# Patient Record
Sex: FEMALE | URBAN - METROPOLITAN AREA
[De-identification: names, ages, dates, MRNs, and addresses within clinical notes are randomized per-mention and may not be internally consistent; named-entity substitution may affect disease eponyms.]

---

## 2022-08-16 ENCOUNTER — OFFICE VISIT (OUTPATIENT)
Dept: SPORTS MEDICINE | Age: 77
End: 2022-08-16
Payer: MEDICARE

## 2022-08-16 VITALS
HEART RATE: 60 BPM | HEIGHT: 62 IN | SYSTOLIC BLOOD PRESSURE: 128 MMHG | BODY MASS INDEX: 32.42 KG/M2 | TEMPERATURE: 97.2 F | WEIGHT: 176.2 LBS | DIASTOLIC BLOOD PRESSURE: 74 MMHG | OXYGEN SATURATION: 97 %

## 2022-08-16 DIAGNOSIS — M46.1 SACROILIITIS (HCC): ICD-10-CM

## 2022-08-16 DIAGNOSIS — M21.70 LEG LENGTH DISCREPANCY: ICD-10-CM

## 2022-08-16 DIAGNOSIS — M47.816 SPONDYLOSIS OF LUMBAR REGION WITHOUT MYELOPATHY OR RADICULOPATHY: Primary | ICD-10-CM

## 2022-08-16 PROCEDURE — 1123F ACP DISCUSS/DSCN MKR DOCD: CPT | Performed by: FAMILY MEDICINE

## 2022-08-16 PROCEDURE — 99213 OFFICE O/P EST LOW 20 MIN: CPT | Performed by: FAMILY MEDICINE

## 2022-08-16 ASSESSMENT — ENCOUNTER SYMPTOMS
CONSTIPATION: 0
DIARRHEA: 0
SHORTNESS OF BREATH: 0
BACK PAIN: 0
NAUSEA: 0

## 2022-08-16 NOTE — PROGRESS NOTES
[facility]) Physicians  Neurosurgery and Pain [facility]  [address] [facility] Cathleen Bacon [place] [address]: [phone]  F: [phone]      Gunnar Carpenter  (1945)    8/16/2022    Subjective:     Gunnar Carpenter is 68 y.o. female who complains today of:    Chief Complaint   Patient presents with    Hip Pain     Left hip pain       HPI     This patient comes in complaining of many years of left-sided low back pain she says it there is really no radicular symptoms associated she says she noticed it mostly when she is walking does not hurt when she is sitting or laying down states she has been to therapy and has not had much success and is here today for further evaluation and treatment  Allergies:  Patient has no known allergies. History reviewed. No pertinent past medical history. History reviewed. No pertinent surgical history. History reviewed. No pertinent family history. Social History     Socioeconomic History    Marital status: Unknown     Spouse name: Not on file    Number of children: Not on file    Years of education: Not on file    Highest education level: Not on file   Occupational History    Not on file   Tobacco Use    Smoking status: Never    Smokeless tobacco: Never   Vaping Use    Vaping Use: Never used   Substance and Sexual Activity    Alcohol use: Never    Drug use: Never    Sexual activity: Not on file   Other Topics Concern    Not on file   Social History Narrative    Not on file     Social Determinants of Health     Financial Resource Strain: Not on file   Food Insecurity: Not on file   Transportation Needs: Not on file   Physical Activity: Not on file   Stress: Not on file   Social Connections: Not on file   Intimate Partner Violence: Not on file   Housing Stability: Not on file       No current outpatient medications on file prior to visit. No current facility-administered medications on file prior to visit.          Review of Systems Constitutional:  Negative for fever. HENT:  Negative for hearing loss. Respiratory:  Negative for shortness of breath. Gastrointestinal:  Negative for constipation, diarrhea and nausea. Genitourinary:  Negative for difficulty urinating. Musculoskeletal:  Negative for back pain and neck pain. Skin:  Negative for rash. Neurological:  Negative for headaches. Hematological:  Does not bruise/bleed easily. Psychiatric/Behavioral:  Negative for sleep disturbance. Objective:     Vitals:  /74   Pulse 60   Temp 97.2 °F (36.2 °C) (Infrared)   Ht 5' 2\" (1.575 m)   Wt 176 lb 3.2 oz (79.9 kg)   SpO2 97%   BMI 32.23 kg/m²        Physical Exam  Constitutional:       Appearance: Normal appearance. HENT:      Head: Normocephalic. Nose: No rhinorrhea. Mouth/Throat:      Pharynx: Oropharynx is clear. Eyes:      Pupils: Pupils are equal, round, and reactive to light. Cardiovascular:      Rate and Rhythm: Normal rate. Pulses: Normal pulses. Pulmonary:      Breath sounds: No wheezing. Abdominal:      Palpations: Abdomen is soft. Musculoskeletal:         General: No deformity. Cervical back: No rigidity. Lymphadenopathy:      Cervical: No cervical adenopathy. Skin:     General: Skin is warm and dry. Findings: No rash. Neurological:      Mental Status: She is alert and oriented to person, place, and time. Psychiatric:         Mood and Affect: Mood normal.         Behavior: Behavior normal.       Ortho Exam   Examination of the lumbar spine with the neurovascular muscle status to be intact patient has full range of motion mild palpable tenderness over the left SI joint but no SI signs no tension signs significant leg length difference being short on the right    I reviewed the patient's x-rays lumbar spine done today    Assessment:      Diagnosis Orders   1.  Spondylosis of lumbar region without myelopathy or radiculopathy  Ambulatory referral to Physical Therapy    XR LUMBAR SPINE (MIN 4 VIEWS)      2. Sacroiliitis ([address]  Ambulatory referral to Physical Therapy    XR LUMBAR SPINE (MIN 4 VIEWS)          Plan:   Patient states she was sent for evaluation and treatment and was started on physical therapy and also suggested 1/4 inch heel lift for the right  See her back in 3 weeks and consider further testing and treatment depending on the results of the left and therapy       No orders of the defined types were placed in this encounter. Orders Placed This Encounter   Procedures    XR LUMBAR SPINE (MIN 4 VIEWS)     Standing Status:   Future     Standing Expiration Date:   11/14/2022     Scheduling Instructions:      Xray Lumbar AP lateral    Ambulatory referral to Physical Therapy     Referral Priority:   Routine     Referral Type:   Eval and Treat     Referral Reason:   Specialty Services Required     Requested Specialty:   Physical Therapist     Number of Visits Requested:   1         Follow up:  Return in about 3 weeks (around 9/6/2022).     LUIGI MENDEZ DO

## 2022-09-08 ENCOUNTER — OFFICE VISIT (OUTPATIENT)
Dept: SPORTS MEDICINE | Age: 77
End: 2022-09-08
Payer: MEDICARE

## 2022-09-08 VITALS — TEMPERATURE: 96.2 F | BODY MASS INDEX: 32.39 KG/M2 | WEIGHT: 176 LBS | HEIGHT: 62 IN

## 2022-09-08 DIAGNOSIS — M21.70 LEG LENGTH DISCREPANCY: ICD-10-CM

## 2022-09-08 DIAGNOSIS — M47.816 SPONDYLOSIS OF LUMBAR REGION WITHOUT MYELOPATHY OR RADICULOPATHY: Primary | ICD-10-CM

## 2022-09-08 DIAGNOSIS — M46.1 SACROILIITIS (HCC): ICD-10-CM

## 2022-09-08 PROCEDURE — 99213 OFFICE O/P EST LOW 20 MIN: CPT | Performed by: FAMILY MEDICINE

## 2022-09-08 PROCEDURE — 1123F ACP DISCUSS/DSCN MKR DOCD: CPT | Performed by: FAMILY MEDICINE

## 2022-09-08 RX ORDER — LEVOTHYROXINE SODIUM 50 MCG
TABLET ORAL
COMMUNITY
Start: 2022-08-06

## 2022-09-08 RX ORDER — NEBIVOLOL HYDROCHLORIDE 10 MG/1
TABLET ORAL
COMMUNITY
Start: 2022-08-31

## 2022-09-08 ASSESSMENT — ENCOUNTER SYMPTOMS
SHORTNESS OF BREATH: 0
DIARRHEA: 0
CONSTIPATION: 0
NAUSEA: 0
BACK PAIN: 0

## 2022-09-08 NOTE — PROGRESS NOTES
[facility]) Physicians  Neurosurgery and Pain [facility]  [address], [address] [facility] , [address], [place] [address]: [phone]  F: [phone]      Christel Maier  (1945)    9/8/2022    Subjective:     Christel Maier is 68 y.o. female who complains today of:    Chief Complaint   Patient presents with    Follow-up     Lower Back Pain       HPI     Patient returns stating that she put the lift and since her parents her backs doing a lot better  Allergies:  Patient has no known allergies. History reviewed. No pertinent past medical history. History reviewed. No pertinent surgical history. History reviewed. No pertinent family history. Social History     Socioeconomic History    Marital status: Unknown     Spouse name: Not on file    Number of children: Not on file    Years of education: Not on file    Highest education level: Not on file   Occupational History    Not on file   Tobacco Use    Smoking status: Never    Smokeless tobacco: Never   Vaping Use    Vaping Use: Never used   Substance and Sexual Activity    Alcohol use: Never    Drug use: Never    Sexual activity: Not on file   Other Topics Concern    Not on file   Social History Narrative    Not on file     Social Determinants of Health     Financial Resource Strain: Not on file   Food Insecurity: Not on file   Transportation Needs: Not on file   Physical Activity: Not on file   Stress: Not on file   Social Connections: Not on file   Intimate Partner Violence: Not on file   Housing Stability: Not on file       Current Outpatient Medications on File Prior to Visit   Medication Sig Dispense Refill    SYNTHROID 50 MCG tablet TAKE 1 TABLET BY MOUTH EVERY MORNING      BYSTOLIC 10 MG tablet        No current facility-administered medications on file prior to visit. Review of Systems   Constitutional:  Negative for fever. HENT:  Negative for hearing loss. Respiratory:  Negative for shortness of breath. Gastrointestinal:  Negative for constipation, diarrhea and nausea. Genitourinary:  Negative for difficulty urinating. Musculoskeletal:  Negative for back pain and neck pain. Skin:  Negative for rash. Neurological:  Negative for headaches. Hematological:  Does not bruise/bleed easily. Psychiatric/Behavioral:  Negative for sleep disturbance. Objective:     Vitals:  Temp (!) 96.2 °F (35.7 °C) (Infrared)   Ht 5' 2\" (1.575 m)   Wt 176 lb (79.8 kg)   BMI 32.19 kg/m² Pain Score:   0 - No pain      Physical Exam  Vitals reviewed. Constitutional:       Appearance: Normal appearance. Skin:     General: Skin is warm and dry. Neurological:      Mental Status: She is alert. Ortho Exam   Examination of the lumbar spine revealed the neurovascular muscular status to be intact patient had near full range of motion no tension signs no palpable tenderness  At the level sacrum with 1/4 inch lift in on the right    Assessment:      Diagnosis Orders   1. Spondylosis of lumbar region without myelopathy or radiculopathy        2. Sacroiliitis ([address]        3. Leg length discrepancy      short right          Plan:   Is doing well at this point I want her to make sure she does her physical therapy exercises we will see her back as needed       No orders of the defined types were placed in this encounter. No orders of the defined types were placed in this encounter. Follow up:  Return if symptoms worsen or fail to improve.     LUIGI MENDEZ, DO

## 2023-05-25 LAB
ANION GAP IN SER/PLAS: 13 MMOL/L (ref 10–20)
CALCIUM (MG/DL) IN SER/PLAS: 10.5 MG/DL (ref 8.6–10.3)
CARBON DIOXIDE, TOTAL (MMOL/L) IN SER/PLAS: 28 MMOL/L (ref 21–32)
CHLORIDE (MMOL/L) IN SER/PLAS: 102 MMOL/L (ref 98–107)
CREATININE (MG/DL) IN SER/PLAS: 1 MG/DL (ref 0.5–1.05)
GFR FEMALE: 58 ML/MIN/1.73M2
GLUCOSE (MG/DL) IN SER/PLAS: 131 MG/DL (ref 74–99)
POTASSIUM (MMOL/L) IN SER/PLAS: 3.2 MMOL/L (ref 3.5–5.3)
SODIUM (MMOL/L) IN SER/PLAS: 140 MMOL/L (ref 136–145)
UREA NITROGEN (MG/DL) IN SER/PLAS: 16 MG/DL (ref 6–23)

## 2023-06-22 LAB — POTASSIUM (MMOL/L) IN SER/PLAS: 2.8 MMOL/L (ref 3.5–5.3)

## 2023-07-11 LAB
ANION GAP IN SER/PLAS: 12 MMOL/L (ref 10–20)
CALCIUM (MG/DL) IN SER/PLAS: 11.3 MG/DL (ref 8.6–10.3)
CARBON DIOXIDE, TOTAL (MMOL/L) IN SER/PLAS: 24 MMOL/L (ref 21–32)
CHLORIDE (MMOL/L) IN SER/PLAS: 104 MMOL/L (ref 98–107)
CREATININE (MG/DL) IN SER/PLAS: 1.17 MG/DL (ref 0.5–1.05)
GFR FEMALE: 48 ML/MIN/1.73M2
GLUCOSE (MG/DL) IN SER/PLAS: 81 MG/DL (ref 74–99)
POTASSIUM (MMOL/L) IN SER/PLAS: 4.1 MMOL/L (ref 3.5–5.3)
SODIUM (MMOL/L) IN SER/PLAS: 136 MMOL/L (ref 136–145)
UREA NITROGEN (MG/DL) IN SER/PLAS: 28 MG/DL (ref 6–23)

## 2023-08-05 LAB
CHOLESTEROL (MG/DL) IN SER/PLAS: 135 MG/DL (ref 0–199)
CHOLESTEROL IN HDL (MG/DL) IN SER/PLAS: 43.4 MG/DL
CHOLESTEROL/HDL RATIO: 3.1
LDL: 69 MG/DL (ref 0–99)
TRIGLYCERIDE (MG/DL) IN SER/PLAS: 113 MG/DL (ref 0–149)
VLDL: 23 MG/DL (ref 0–40)

## 2023-08-14 ENCOUNTER — HOSPITAL ENCOUNTER (OUTPATIENT)
Dept: DATA CONVERSION | Facility: HOSPITAL | Age: 78
End: 2023-08-14
Attending: INTERNAL MEDICINE | Admitting: INTERNAL MEDICINE
Payer: MEDICARE

## 2023-08-14 DIAGNOSIS — C18.7 MALIGNANT NEOPLASM OF SIGMOID COLON (MULTI): ICD-10-CM

## 2023-08-14 DIAGNOSIS — K64.1 SECOND DEGREE HEMORRHOIDS: ICD-10-CM

## 2023-08-14 DIAGNOSIS — D12.2 BENIGN NEOPLASM OF ASCENDING COLON: ICD-10-CM

## 2023-08-14 DIAGNOSIS — K56.690 OTHER PARTIAL INTESTINAL OBSTRUCTION (MULTI): ICD-10-CM

## 2023-08-14 DIAGNOSIS — Z12.11 ENCOUNTER FOR SCREENING FOR MALIGNANT NEOPLASM OF COLON: ICD-10-CM

## 2023-08-14 DIAGNOSIS — K62.5 HEMORRHAGE OF ANUS AND RECTUM: ICD-10-CM

## 2023-08-15 LAB
ALANINE AMINOTRANSFERASE (SGPT) (U/L) IN SER/PLAS: 27 U/L (ref 7–45)
ALBUMIN (G/DL) IN SER/PLAS: 4 G/DL (ref 3.4–5)
ALKALINE PHOSPHATASE (U/L) IN SER/PLAS: 250 U/L (ref 33–136)
ANION GAP IN SER/PLAS: 12 MMOL/L (ref 10–20)
ASPARTATE AMINOTRANSFERASE (SGOT) (U/L) IN SER/PLAS: 21 U/L (ref 9–39)
BILIRUBIN TOTAL (MG/DL) IN SER/PLAS: 0.6 MG/DL (ref 0–1.2)
CALCIUM (MG/DL) IN SER/PLAS: 11 MG/DL (ref 8.6–10.3)
CARBON DIOXIDE, TOTAL (MMOL/L) IN SER/PLAS: 25 MMOL/L (ref 21–32)
CARCINOEMBRYONIC AG (NG/ML) IN SER/PLAS: 71.3 UG/L
CHLORIDE (MMOL/L) IN SER/PLAS: 105 MMOL/L (ref 98–107)
CREATININE (MG/DL) IN SER/PLAS: 1.26 MG/DL (ref 0.5–1.05)
ERYTHROCYTE DISTRIBUTION WIDTH (RATIO) BY AUTOMATED COUNT: 16.3 % (ref 11.5–14.5)
ERYTHROCYTE MEAN CORPUSCULAR HEMOGLOBIN CONCENTRATION (G/DL) BY AUTOMATED: 30.8 G/DL (ref 32–36)
ERYTHROCYTE MEAN CORPUSCULAR VOLUME (FL) BY AUTOMATED COUNT: 87 FL (ref 80–100)
ERYTHROCYTES (10*6/UL) IN BLOOD BY AUTOMATED COUNT: 4.04 X10E12/L (ref 4–5.2)
GFR FEMALE: 44 ML/MIN/1.73M2
GLUCOSE (MG/DL) IN SER/PLAS: 99 MG/DL (ref 74–99)
HEMATOCRIT (%) IN BLOOD BY AUTOMATED COUNT: 35.1 % (ref 36–46)
HEMOGLOBIN (G/DL) IN BLOOD: 10.8 G/DL (ref 12–16)
LEUKOCYTES (10*3/UL) IN BLOOD BY AUTOMATED COUNT: 9.2 X10E9/L (ref 4.4–11.3)
NRBC (PER 100 WBCS) BY AUTOMATED COUNT: 0 /100 WBC (ref 0–0)
PLATELETS (10*3/UL) IN BLOOD AUTOMATED COUNT: 305 X10E9/L (ref 150–450)
POTASSIUM (MMOL/L) IN SER/PLAS: 4.2 MMOL/L (ref 3.5–5.3)
PROTEIN TOTAL: 7.3 G/DL (ref 6.4–8.2)
SODIUM (MMOL/L) IN SER/PLAS: 138 MMOL/L (ref 136–145)
UREA NITROGEN (MG/DL) IN SER/PLAS: 14 MG/DL (ref 6–23)

## 2023-08-21 LAB
COMPLETE PATHOLOGY REPORT: NORMAL
CONVERTED ADDENDUM DIAGNOSIS 2: NORMAL
CONVERTED ADDENDUM DIAGNOSIS 3: NORMAL
CONVERTED ADDENDUM DIAGNOSIS 4: NORMAL
CONVERTED ADDENDUM DIAGNOSIS 5: NORMAL
CONVERTED CLINICAL DIAGNOSIS-HISTORY: NORMAL
CONVERTED FINAL DIAGNOSIS: NORMAL
CONVERTED FINAL REPORT PDF LINK TO COPY AND PASTE: NORMAL
CONVERTED GROSS DESCRIPTION: NORMAL

## 2023-09-06 LAB
APPEARANCE, URINE: CLEAR
BILIRUBIN, URINE: NEGATIVE
BLOOD, URINE: ABNORMAL
COLOR, URINE: YELLOW
GLUCOSE, URINE: NEGATIVE MG/DL
KETONES, URINE: NEGATIVE MG/DL
LEUKOCYTE ESTERASE, URINE: NEGATIVE
NITRITE, URINE: NEGATIVE
PH, URINE: 5 (ref 5–8)
PROTEIN, URINE: ABNORMAL MG/DL
RBC, URINE: <1 /HPF (ref 0–5)
SPECIFIC GRAVITY, URINE: 1.01 (ref 1–1.03)
SQUAMOUS EPITHELIAL CELLS, URINE: 1 /HPF
TRANSITIONAL EPITHELIAL CELLS, URINE: <1 /HPF
UROBILINOGEN, URINE: <2 MG/DL (ref 0–1.9)
WBC, URINE: 2 /HPF (ref 0–5)

## 2023-09-07 LAB — URINE CULTURE: NORMAL

## 2023-09-15 PROBLEM — M54.42 ACUTE LEFT-SIDED LOW BACK PAIN WITH LEFT-SIDED SCIATICA: Status: ACTIVE | Noted: 2023-09-15

## 2023-09-15 PROBLEM — A04.8 H. PYLORI INFECTION: Status: ACTIVE | Noted: 2023-09-15

## 2023-09-15 PROBLEM — Z74.09 DECREASED FUNCTIONAL MOBILITY AND ENDURANCE: Status: ACTIVE | Noted: 2023-09-15

## 2023-09-15 PROBLEM — E78.5 HYPERLIPIDEMIA: Status: ACTIVE | Noted: 2023-09-15

## 2023-09-15 PROBLEM — E87.6 HYPOKALEMIA: Status: ACTIVE | Noted: 2023-09-15

## 2023-09-15 PROBLEM — R29.898 DECREASED GRIP STRENGTH OF RIGHT HAND: Status: ACTIVE | Noted: 2023-09-15

## 2023-09-15 PROBLEM — C18.9 COLON CANCER (MULTI): Status: ACTIVE | Noted: 2023-09-15

## 2023-09-15 PROBLEM — I63.9: Status: ACTIVE | Noted: 2023-09-15

## 2023-09-15 PROBLEM — M25.631 DECREASED RANGE OF MOTION OF RIGHT WRIST: Status: ACTIVE | Noted: 2023-09-15

## 2023-09-15 PROBLEM — I10 HTN (HYPERTENSION): Status: ACTIVE | Noted: 2023-09-15

## 2023-09-15 PROBLEM — K62.5 RECTAL BLEEDING: Status: ACTIVE | Noted: 2023-09-15

## 2023-09-15 PROBLEM — K92.1 MELENA: Status: ACTIVE | Noted: 2023-09-15

## 2023-09-15 PROBLEM — Z74.09 IMPAIRED FUNCTIONAL MOBILITY, BALANCE, GAIT, AND ENDURANCE: Status: ACTIVE | Noted: 2023-09-15

## 2023-09-15 PROBLEM — I63.9 CVA (CEREBRAL VASCULAR ACCIDENT) (MULTI): Status: ACTIVE | Noted: 2023-09-15

## 2023-09-15 RX ORDER — CLARITHROMYCIN 500 MG/1
1 TABLET, FILM COATED ORAL 2 TIMES DAILY
COMMUNITY
Start: 2017-11-29 | End: 2023-10-23 | Stop reason: WASHOUT

## 2023-09-15 RX ORDER — ASPIRIN 81 MG/1
81 TABLET ORAL DAILY
COMMUNITY

## 2023-09-15 RX ORDER — NEBIVOLOL HYDROCHLORIDE 10 MG/1
TABLET ORAL
COMMUNITY

## 2023-09-15 RX ORDER — TAMSULOSIN HYDROCHLORIDE 0.4 MG/1
0.4 CAPSULE ORAL
COMMUNITY
Start: 2023-05-30 | End: 2023-10-23 | Stop reason: WASHOUT

## 2023-09-15 RX ORDER — TRIAMTERENE AND HYDROCHLOROTHIAZIDE 75; 50 MG/1; MG/1
1 TABLET ORAL
COMMUNITY
End: 2023-10-23 | Stop reason: WASHOUT

## 2023-09-15 RX ORDER — LEVOTHYROXINE SODIUM 50 UG/1
TABLET ORAL
COMMUNITY
End: 2023-10-23 | Stop reason: WASHOUT

## 2023-09-15 RX ORDER — FUROSEMIDE 20 MG/1
20 TABLET ORAL
COMMUNITY
Start: 2023-08-02

## 2023-09-15 RX ORDER — ATORVASTATIN CALCIUM 40 MG/1
40 TABLET, FILM COATED ORAL
COMMUNITY
Start: 2023-05-31

## 2023-09-15 RX ORDER — SPIRONOLACTONE 50 MG/1
50 TABLET, FILM COATED ORAL DAILY
COMMUNITY
Start: 2023-06-27

## 2023-09-15 RX ORDER — POTASSIUM CHLORIDE 750 MG/1
10 TABLET, EXTENDED RELEASE ORAL
COMMUNITY
Start: 2023-05-30 | End: 2023-10-23 | Stop reason: WASHOUT

## 2023-09-15 RX ORDER — PANTOPRAZOLE SODIUM 40 MG/1
1 TABLET, DELAYED RELEASE ORAL 2 TIMES DAILY
COMMUNITY
End: 2023-10-23 | Stop reason: WASHOUT

## 2023-09-15 RX ORDER — FLUOCINONIDE 0.5 MG/G
OINTMENT TOPICAL 2 TIMES DAILY
COMMUNITY
Start: 2023-06-06 | End: 2023-10-23 | Stop reason: WASHOUT

## 2023-09-15 RX ORDER — AMLODIPINE BESYLATE 5 MG/1
5 TABLET ORAL
COMMUNITY
Start: 2023-08-02

## 2023-10-16 PROBLEM — R29.898 DECREASED GRIP STRENGTH OF RIGHT HAND: Status: RESOLVED | Noted: 2023-09-15 | Resolved: 2023-10-16

## 2023-10-16 PROBLEM — I63.9 CVA (CEREBRAL VASCULAR ACCIDENT) (MULTI): Chronic | Status: ACTIVE | Noted: 2023-09-15

## 2023-10-16 PROBLEM — Z09 HOSPITAL DISCHARGE FOLLOW-UP: Status: ACTIVE | Noted: 2023-10-16

## 2023-10-16 PROBLEM — E78.5 HYPERLIPIDEMIA: Chronic | Status: ACTIVE | Noted: 2023-09-15

## 2023-10-16 PROBLEM — A04.8 H. PYLORI INFECTION: Status: RESOLVED | Noted: 2023-09-15 | Resolved: 2023-10-16

## 2023-10-16 PROBLEM — M54.42 ACUTE LEFT-SIDED LOW BACK PAIN WITH LEFT-SIDED SCIATICA: Status: RESOLVED | Noted: 2023-09-15 | Resolved: 2023-10-16

## 2023-10-16 PROBLEM — I10 HTN (HYPERTENSION): Chronic | Status: ACTIVE | Noted: 2023-09-15

## 2023-10-16 PROBLEM — Z74.09 IMPAIRED FUNCTIONAL MOBILITY, BALANCE, GAIT, AND ENDURANCE: Status: RESOLVED | Noted: 2023-09-15 | Resolved: 2023-10-16

## 2023-10-16 PROBLEM — Z74.09 DECREASED FUNCTIONAL MOBILITY AND ENDURANCE: Status: RESOLVED | Noted: 2023-09-15 | Resolved: 2023-10-16

## 2023-10-16 PROBLEM — R00.1 BRADYCARDIA: Chronic | Status: ACTIVE | Noted: 2023-10-16

## 2023-10-16 PROBLEM — E87.6 HYPOKALEMIA: Status: RESOLVED | Noted: 2023-09-15 | Resolved: 2023-10-16

## 2023-10-16 PROBLEM — I35.1 AORTIC REGURGITATION: Chronic | Status: ACTIVE | Noted: 2023-10-16

## 2023-10-17 ENCOUNTER — APPOINTMENT (OUTPATIENT)
Dept: CARDIOLOGY | Facility: CLINIC | Age: 78
End: 2023-10-17
Payer: MEDICARE

## 2023-10-17 NOTE — PROGRESS NOTES
Referred by No ref. provider found    HPI ***    Past Medical History:  Problem List Items Addressed This Visit    None       No past medical history on file.          Past Surgical History:  She has a past surgical history that includes MR angio head wo IV contrast (4/5/2023) and MR angio neck wo IV contrast (4/5/2023).      Social History:  She has no history on file for tobacco use, alcohol use, and drug use.    Family History:  Family History   Problem Relation Name Age of Onset    Stroke Mother          Allergies:  Patient has no known allergies.    Outpatient Medications:  Current Outpatient Medications   Medication Instructions    amLODIPine (NORVASC) 5 mg, oral, Daily before breakfast    aspirin 81 mg, oral, Daily    atorvastatin (LIPITOR) 40 mg, oral, Daily before breakfast    Bystolic 10 mg tablet oral    clarithromycin (Biaxin) 500 mg tablet 1 tablet, oral, 2 times daily    fluocinonide (Lidex) 0.05 % ointment Topical, 2 times daily    furosemide (LASIX) 20 mg, oral, Daily before breakfast    Klor-Con M10 10 mEq ER tablet 10 mEq, oral, Daily before breakfast    pantoprazole (ProtoNix) 40 mg EC tablet 1 tablet, oral, 2 times daily    spironolactone (ALDACTONE) 50 mg, oral, Daily    Synthroid 50 mcg tablet oral    tamsulosin (FLOMAX) 0.4 mg, oral, Daily before breakfast    triamterene-hydrochlorothiazid (Maxzide) 75-50 mg tablet 1 tablet, oral, Daily before breakfast        Last Recorded Vitals:  There were no vitals filed for this visit.    Physical Exam    Physical  Patient is alert and oriented x3.  HEENT is unremarkable mucous members are moist  Neck no JVP no bruits upstrokes are full no thyromegaly  Lungs are clear bilaterally.  No wheezing crackles or rales  Heart regular rhythm normal S1-S2 there is no S3 no murmurs are heard.  Abdomen is soft vessels are positive nontender nondistended no organomegaly no pulsatile masses  Extremities have no edema.  Distal pulses present palpable.  Neuro is grossly  nonfocal  Skin has no rashes     Last Labs:  CBC -  Lab Results   Component Value Date    WBC 9.2 08/15/2023    HGB 10.8 (L) 08/15/2023    HCT 35.1 (L) 08/15/2023    MCV 87 08/15/2023     08/15/2023       CMP -  Lab Results   Component Value Date    CALCIUM 11.0 (H) 08/15/2023    PROT 7.3 08/15/2023    ALBUMIN 4.0 08/15/2023    AST 21 08/15/2023    ALT 27 08/15/2023    ALKPHOS 250 (H) 08/15/2023    BILITOT 0.6 08/15/2023       LIPID PANEL -   Lab Results   Component Value Date    CHOL 135 08/05/2023    HDL 43.4 08/05/2023    CHHDL 3.1 08/05/2023    VLDL 23 08/05/2023    TRIG 113 08/05/2023    NHDL 179 04/04/2023       RENAL FUNCTION PANEL -   Lab Results   Component Value Date    K 4.2 08/15/2023       Lab Results   Component Value Date    BNP 44 04/06/2023    HGBA1C 6.0 (A) 04/05/2023     Procedure    Echo EF 50-55%, Mod LAE, mild to mod AI         Assessment/Plan   4/11/2023      1. Bradycardia.  Somehow she has been receiving metoprolol succinate 100 daily and Bystolic.  I will stop her metoprolol succinate.  I will decrease her Bystolic to 10 mg daily.  She was on this as an outpatient and doing well.  She does have an event  recorder on.  Will monitor for any asymptomatic episodes of A-fib.     2.  Hypertension.  Will monitor on the new adjusted medication regimen      3.  Aortic insufficiency.  This is mild to moderate.  Repeat echo should be done in 1 year.      4.  CVA.  Continue high intensity statins and antiplatelet therapy along with blood pressure control.     Thank you for this consult I will follow with you      4/13/2023      1. Bradycardia.  Now off of metoprolol succinate and off of Bystolic.  Blood pressures are excellent.  Heart rates remain low.  She has had no further episodes of dizziness lightheadedness or fatigue during rehab.  No episodes of atrial fibrillation.      2.  Hypertension.  Well controlled at this time.      3.  Aortic insufficiency.  Mild to moderate.  Repeat echo in 1  year.      4.  CVA.  Continue high intensity statins and DAPT.      4/14/2023      1. Bradycardia.  Resolved off beta-blockers which were metoprolol and Bystolic.  Blood pressures remained stable.      2.  Hypertension well-controlled      3.  Aortic insufficiency.  Mild to moderate.  Repeat echo in 1 year.  Follow-up as an outpatient with Dr. Moore.      From a cardiac standpoint she is stable.  I will sign off at this time.       Alejandro Garcia MD     Instructions and follow up

## 2023-10-20 PROBLEM — I63.9: Chronic | Status: ACTIVE | Noted: 2023-09-15

## 2023-10-20 PROBLEM — K62.5 RECTAL BLEEDING: Status: RESOLVED | Noted: 2023-09-15 | Resolved: 2023-10-20

## 2023-10-20 RX ORDER — ONDANSETRON HYDROCHLORIDE 8 MG/1
8 TABLET, FILM COATED ORAL EVERY 8 HOURS PRN
COMMUNITY
Start: 2023-09-18 | End: 2023-10-23 | Stop reason: WASHOUT

## 2023-10-20 RX ORDER — POLYETHYLENE GLYCOL 3350 17 G/17G
17 POWDER, FOR SOLUTION ORAL
COMMUNITY
Start: 2023-09-25 | End: 2023-10-23 | Stop reason: WASHOUT

## 2023-10-20 RX ORDER — PANTOPRAZOLE SODIUM 20 MG/1
20 TABLET, DELAYED RELEASE ORAL
COMMUNITY
Start: 2023-08-22

## 2023-10-20 RX ORDER — LIDOCAINE AND PRILOCAINE 25; 25 MG/G; MG/G
CREAM TOPICAL
COMMUNITY
Start: 2023-10-02 | End: 2023-10-23 | Stop reason: WASHOUT

## 2023-10-20 RX ORDER — PROCHLORPERAZINE MALEATE 10 MG
10 TABLET ORAL EVERY 6 HOURS PRN
COMMUNITY
Start: 2023-09-18 | End: 2023-10-23 | Stop reason: WASHOUT

## 2023-10-20 RX ORDER — METRONIDAZOLE 500 MG/1
TABLET ORAL
COMMUNITY
Start: 2023-09-25 | End: 2023-10-23 | Stop reason: WASHOUT

## 2023-10-21 NOTE — PROGRESS NOTES
Referred by No ref. provider found    HPI wanting the patient in follow-up.  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  She was bradycardic both because she was taking metoprolol succinate in addition to Bystolic.  We stopped the metoprolol.  She has been doing fine.  She is having no chest pain or pressure no shortness of breath no palpitations no dizziness.  She is currently getting chemotherapy at the [facility] because of colon cancer.  Her blood pressures when she is not here have been better typically in the 140 range.            Past Medical History:  Problem List Items Addressed This Visit    None       Past Medical History:   Diagnosis Date    Aortic regurgitation 10/16/2023    Bradycardia 10/16/2023    CVA (cerebral vascular accident) (CMS/HCC) 09/15/2023    HTN (hypertension) 09/15/2023    Hyperlipidemia 09/15/2023             Past Surgical History:  She has a past surgical history that includes MR angio head wo IV contrast (4/5/2023) and MR angio neck wo IV contrast (4/5/2023).      Social History:  She has no history on file for tobacco use, alcohol use, and drug use.    Family History:  Family History   Problem Relation Name Age of Onset    Stroke Mother          Allergies:  Patient has no known allergies.    Outpatient Medications:  Current Outpatient Medications   Medication Instructions    amLODIPine (NORVASC) 5 mg, oral, Daily before breakfast    aspirin 81 mg, oral, Daily    atorvastatin (LIPITOR) 40 mg, oral, Daily before breakfast    Bystolic 10 mg tablet oral    clarithromycin (Biaxin) 500 mg tablet 1 tablet, oral, 2 times daily    fluocinonide (Lidex) 0.05 % ointment Topical, 2 times daily    furosemide (LASIX) 20 mg, oral, Daily before breakfast    Klor-Con M10 10 mEq ER tablet 10 mEq, oral, Daily before breakfast    lidocaine-prilocaine (Emla) 2.5-2.5 % cream Topical    metroNIDAZOLE (Flagyl) 500 mg tablet     ondansetron (ZOFRAN) 8 mg, oral, Every 8 hours PRN     pantoprazole (ProtoNix) 40 mg EC tablet 1 tablet, oral, 2 times daily    pantoprazole (PROTONIX) 20 mg, oral, Daily before breakfast    polyethylene glycol (GLYCOLAX, MIRALAX) 17 g, oral, Daily RT    prochlorperazine (COMPAZINE) 10 mg, oral, Every 6 hours PRN    spironolactone (ALDACTONE) 50 mg, oral, Daily    Synthroid 50 mcg tablet oral    tamsulosin (FLOMAX) 0.4 mg, oral, Daily before breakfast    triamterene-hydrochlorothiazid (Maxzide) 75-50 mg tablet 1 tablet, oral, Daily before breakfast        Last Recorded Vitals:  There were no vitals filed for this visit.    Physical Exam    Physical  Patient is alert and oriented x3.  HEENT is unremarkable mucous members are moist  Neck no JVP no bruits upstrokes are full no thyromegaly  Lungs are clear bilaterally.  No wheezing crackles or rales  Heart regular rhythm normal S1-S2 there is no S3 no murmurs are heard.  Abdomen is soft vessels are positive nontender nondistended no organomegaly no pulsatile masses  Extremities have no edema.  Distal pulses present palpable.  Neuro is grossly nonfocal  Skin has no rashes     Last Labs:  CBC -  Lab Results   Component Value Date    WBC 9.2 08/15/2023    HGB 10.8 (L) 08/15/2023    HCT 35.1 (L) 08/15/2023    MCV 87 08/15/2023     08/15/2023       CMP -  Lab Results   Component Value Date    CALCIUM 11.0 (H) 08/15/2023    PROT 7.3 08/15/2023    ALBUMIN 4.0 08/15/2023    AST 21 08/15/2023    ALT 27 08/15/2023    ALKPHOS 250 (H) 08/15/2023    BILITOT 0.6 08/15/2023       LIPID PANEL -   Lab Results   Component Value Date    CHOL 135 08/05/2023    HDL 43.4 08/05/2023    CHHDL 3.1 08/05/2023    VLDL 23 08/05/2023    TRIG 113 08/05/2023    NHDL 179 04/04/2023       RENAL FUNCTION PANEL -   Lab Results   Component Value Date    K 4.2 08/15/2023       Lab Results   Component Value Date    BNP 44 04/06/2023    HGBA1C 6.0 (A) 04/05/2023     Procedures    Echo 4/5/23 EF 50-55%, Mod LAE, Mild to mod AI    Event 4/2023 Avg HR 88,  No afib         Assessment/Plan   1.  Bradycardia.  This is during her hospitalization for a pontine CVA.  This is because of 2 beta-blockers being taken 1 was metoprolol and Bystolic.  Since we discontinue the metoprolol her heart rates have been fine.  She has no cardiac symptoms.    2.  Left pontine CVA.  Her event recorder did not reveal any episodes of A-fib.  Her echo was unremarkable with exception of aortic insufficiency.  She is on antiplatelet therapy and statins.    3.  Aortic insufficiency.  A repeat echo should be done in April 2024.  This is mild to moderate.    4.  Hypertension.  Less than ideal control.  She is not able to tolerate a higher dose of amlodipine because her ophthalmologist said it was causing issues with her eye.  She is on spironolactone 50 mg.  At home she tells me her blood pressures are in the 140s.  We will need to monitor this.    5.  Hyperlipidemia.  This was monitored with Dr. Nash and her neurologist    6.  Hypokalemia.  Much improved with the spironolactone.  I reviewed her records from the [facility] and her most recent blood work.    My plan will be to see her back in 6 months.  This will be after her repeat echo.      Alejandro Garcia MD     Instructions and follow up

## 2023-10-23 ENCOUNTER — OFFICE VISIT (OUTPATIENT)
Dept: CARDIOLOGY | Facility: CLINIC | Age: 78
End: 2023-10-23
Payer: MEDICARE

## 2023-10-23 VITALS
WEIGHT: 172 LBS | HEART RATE: 75 BPM | OXYGEN SATURATION: 97 % | BODY MASS INDEX: 28.62 KG/M2 | DIASTOLIC BLOOD PRESSURE: 86 MMHG | SYSTOLIC BLOOD PRESSURE: 164 MMHG

## 2023-10-23 DIAGNOSIS — E78.2 MIXED HYPERLIPIDEMIA: Chronic | ICD-10-CM

## 2023-10-23 DIAGNOSIS — R00.1 BRADYCARDIA: Chronic | ICD-10-CM

## 2023-10-23 DIAGNOSIS — Z09 HOSPITAL DISCHARGE FOLLOW-UP: ICD-10-CM

## 2023-10-23 DIAGNOSIS — I10 PRIMARY HYPERTENSION: Chronic | ICD-10-CM

## 2023-10-23 DIAGNOSIS — I63.9 CEREBROVASCULAR ACCIDENT (CVA), UNSPECIFIED MECHANISM (MULTI): Chronic | ICD-10-CM

## 2023-10-23 DIAGNOSIS — I35.1 NONRHEUMATIC AORTIC VALVE INSUFFICIENCY: Primary | Chronic | ICD-10-CM

## 2023-10-23 PROCEDURE — 1160F RVW MEDS BY RX/DR IN RCRD: CPT | Performed by: INTERNAL MEDICINE

## 2023-10-23 PROCEDURE — 1036F TOBACCO NON-USER: CPT | Performed by: INTERNAL MEDICINE

## 2023-10-23 PROCEDURE — 3077F SYST BP >= 140 MM HG: CPT | Performed by: INTERNAL MEDICINE

## 2023-10-23 PROCEDURE — 1159F MED LIST DOCD IN RCRD: CPT | Performed by: INTERNAL MEDICINE

## 2023-10-23 PROCEDURE — 3079F DIAST BP 80-89 MM HG: CPT | Performed by: INTERNAL MEDICINE

## 2023-10-23 PROCEDURE — 99214 OFFICE O/P EST MOD 30 MIN: CPT | Performed by: INTERNAL MEDICINE

## 2023-10-23 NOTE — PATIENT INSTRUCTIONS
1.  Bradycardia.  This is during her hospitalization for a pontine CVA.  This is because of 2 beta-blockers being taken 1 was metoprolol and Bystolic.  Since we discontinue the metoprolol her heart rates have been fine.  She has no cardiac symptoms.    2.  Left pontine CVA.  Her event recorder did not reveal any episodes of A-fib.  Her echo was unremarkable with exception of aortic insufficiency.  She is on antiplatelet therapy and statins.    3.  Aortic insufficiency.  A repeat echo should be done in April 2024.  This is mild to moderate.    4.  Hypertension.  Less than ideal control.  She is not able to tolerate a higher dose of amlodipine because her ophthalmologist said it was causing issues with her eye.  She is on spironolactone 50 mg.  At home she tells me her blood pressures are in the 140s.  We will need to monitor this.    5.  Hyperlipidemia.  This was monitored with Dr. Nash and her neurologist    6.  Hypokalemia.  Much improved with the spironolactone.  I reviewed her records from the [facility] and her most recent blood work.    My plan will be to see her back in 6 months.  This will be after her repeat echo.

## 2024-04-22 PROBLEM — C18.9 CARCINOMA OF COLON METASTATIC TO LIVER (MULTI): Status: ACTIVE | Noted: 2023-09-07

## 2024-04-22 PROBLEM — C78.7 CARCINOMA OF COLON METASTATIC TO LIVER (MULTI): Status: ACTIVE | Noted: 2023-09-07

## 2024-04-22 PROBLEM — M21.70 LEG LENGTH DISCREPANCY: Status: ACTIVE | Noted: 2022-08-16

## 2024-04-25 ENCOUNTER — APPOINTMENT (OUTPATIENT)
Dept: CARDIOLOGY | Facility: CLINIC | Age: 79
End: 2024-04-25
Payer: MEDICARE

## 2024-04-25 ENCOUNTER — HOSPITAL ENCOUNTER (OUTPATIENT)
Dept: CARDIOLOGY | Facility: CLINIC | Age: 79
Discharge: HOME | End: 2024-04-25
Payer: MEDICARE

## 2024-04-25 VITALS
BODY MASS INDEX: 28.66 KG/M2 | DIASTOLIC BLOOD PRESSURE: 86 MMHG | HEIGHT: 65 IN | WEIGHT: 172 LBS | SYSTOLIC BLOOD PRESSURE: 164 MMHG

## 2024-04-25 DIAGNOSIS — I63.9 CEREBROVASCULAR ACCIDENT (CVA), UNSPECIFIED MECHANISM (MULTI): Chronic | ICD-10-CM

## 2024-04-25 DIAGNOSIS — I35.1 NONRHEUMATIC AORTIC VALVE INSUFFICIENCY: Chronic | ICD-10-CM

## 2024-04-25 DIAGNOSIS — Z86.73 PERSONAL HISTORY OF TRANSIENT ISCHEMIC ATTACK (TIA), AND CEREBRAL INFARCTION WITHOUT RESIDUAL DEFICITS: ICD-10-CM

## 2024-04-25 DIAGNOSIS — G45.9 TRANSIENT CEREBRAL ISCHEMIC ATTACK, UNSPECIFIED: ICD-10-CM

## 2024-04-25 PROCEDURE — 93306 TTE W/DOPPLER COMPLETE: CPT

## 2024-04-25 PROCEDURE — 93306 TTE W/DOPPLER COMPLETE: CPT | Performed by: INTERNAL MEDICINE

## 2024-04-27 LAB
AORTIC VALVE MEAN GRADIENT: 5.5 MMHG
AORTIC VALVE PEAK VELOCITY: 1.76 M/S
AV PEAK GRADIENT: 12.4 MMHG
AVA (PEAK VEL): 1.76 CM2
AVA (VTI): 2.1 CM2
EJECTION FRACTION APICAL 4 CHAMBER: 52.7
LEFT ATRIUM VOLUME AREA LENGTH INDEX BSA: 27.3 ML/M2
LEFT VENTRICLE INTERNAL DIMENSION DIASTOLE: 4.24 CM (ref 3.5–6)
LEFT VENTRICULAR OUTFLOW TRACT DIAMETER: 1.97 CM
MITRAL VALVE E/A RATIO: 0.88
RIGHT VENTRICLE FREE WALL PEAK S': 1.2 CM/S
RIGHT VENTRICLE PEAK SYSTOLIC PRESSURE: 31.8 MMHG
TRICUSPID ANNULAR PLANE SYSTOLIC EXCURSION: 2.4 CM

## 2024-05-16 ENCOUNTER — OFFICE VISIT (OUTPATIENT)
Dept: CARDIOLOGY | Facility: CLINIC | Age: 79
End: 2024-05-16
Payer: MEDICARE

## 2024-05-16 VITALS
BODY MASS INDEX: 29.29 KG/M2 | OXYGEN SATURATION: 98 % | SYSTOLIC BLOOD PRESSURE: 134 MMHG | WEIGHT: 176 LBS | HEART RATE: 56 BPM | DIASTOLIC BLOOD PRESSURE: 84 MMHG

## 2024-05-16 DIAGNOSIS — E78.2 MIXED HYPERLIPIDEMIA: Chronic | ICD-10-CM

## 2024-05-16 DIAGNOSIS — I10 PRIMARY HYPERTENSION: Chronic | ICD-10-CM

## 2024-05-16 DIAGNOSIS — I35.1 NONRHEUMATIC AORTIC VALVE INSUFFICIENCY: Primary | Chronic | ICD-10-CM

## 2024-05-16 DIAGNOSIS — R00.1 BRADYCARDIA: Chronic | ICD-10-CM

## 2024-05-16 PROBLEM — K56.600 PARTIAL INTESTINAL OBSTRUCTION (MULTI): Status: ACTIVE | Noted: 2024-05-16

## 2024-05-16 PROBLEM — I63.9 CEREBRAL INFARCTION (MULTI): Status: ACTIVE | Noted: 2023-09-15

## 2024-05-16 PROBLEM — D50.9 IRON DEFICIENCY ANEMIA: Status: ACTIVE | Noted: 2024-02-05

## 2024-05-16 PROBLEM — R60.0 EDEMA OF LOWER EXTREMITY: Status: ACTIVE | Noted: 2024-05-16

## 2024-05-16 PROBLEM — K64.1 GRADE II HEMORRHOIDS: Status: ACTIVE | Noted: 2024-05-16

## 2024-05-16 PROCEDURE — 1160F RVW MEDS BY RX/DR IN RCRD: CPT | Performed by: INTERNAL MEDICINE

## 2024-05-16 PROCEDURE — 3079F DIAST BP 80-89 MM HG: CPT | Performed by: INTERNAL MEDICINE

## 2024-05-16 PROCEDURE — 3075F SYST BP GE 130 - 139MM HG: CPT | Performed by: INTERNAL MEDICINE

## 2024-05-16 PROCEDURE — 1159F MED LIST DOCD IN RCRD: CPT | Performed by: INTERNAL MEDICINE

## 2024-05-16 PROCEDURE — 1036F TOBACCO NON-USER: CPT | Performed by: INTERNAL MEDICINE

## 2024-05-16 PROCEDURE — 99213 OFFICE O/P EST LOW 20 MIN: CPT | Performed by: INTERNAL MEDICINE

## 2024-05-16 RX ORDER — LEVOTHYROXINE SODIUM 50 UG/1
50 TABLET ORAL
COMMUNITY

## 2024-05-16 RX ORDER — SENNOSIDES 8.6 MG
1 TABLET ORAL 2 TIMES DAILY
COMMUNITY

## 2024-05-16 RX ORDER — ONDANSETRON HYDROCHLORIDE 8 MG/1
8 TABLET, FILM COATED ORAL EVERY 8 HOURS PRN
COMMUNITY
Start: 2024-03-18

## 2024-05-16 NOTE — PATIENT INSTRUCTIONS
1.  Bradycardia.  No recurrence since stopping metoprolol and only taking Bystolic    2.  Left pontine CVA.  Her event recorder did not reveal any episodes of A-fib.  Her echo was unremarkable with exception of aortic insufficiency.  She is on antiplatelet therapy and statins.    3.  Aortic insufficiency.  Repeat echo 4/25/2024 at the  is mild still.    4.  Hypertension.  Reasonable control    5.  Hyperlipidemia.  This was monitored with Dr. Nash and her neurologist    6.  Hypokalemia. Stable. Followed at [facility] with her chemo blood work    RTC as needed

## 2024-05-16 NOTE — PROGRESS NOTES
Referred by No ref. provider found    SHELLY Matthews is here for a 7 month follow up. No CP/SOB. She feels well. Getting chemo weekly at [facility] for colon CA.     Past Medical History:  Problem List Items Addressed This Visit    None       Past Medical History:   Diagnosis Date    Aortic regurgitation 10/16/2023    Bradycardia 10/16/2023    CVA (cerebral vascular accident) (Multi) 09/15/2023    HTN (hypertension) 09/15/2023    Hyperlipidemia 09/15/2023    Left pontine CVA (Multi) 09/15/2023             Past Surgical History:  She has a past surgical history that includes MR angio head wo IV contrast (4/5/2023) and MR angio neck wo IV contrast (4/5/2023).      Social History:  She reports that she has never smoked. She has never used smokeless tobacco. No history on file for alcohol use and drug use.    Family History:  Family History   Problem Relation Name Age of Onset    Stroke Mother          Allergies:  Patient has no known allergies.    Outpatient Medications:  Current Outpatient Medications   Medication Instructions    amLODIPine (NORVASC) 5 mg, oral, Daily before breakfast    aspirin 81 mg, oral, Daily    atorvastatin (LIPITOR) 40 mg, oral, Daily before breakfast    Bystolic 10 mg tablet oral    furosemide (LASIX) 20 mg, oral, Daily before breakfast    pantoprazole (PROTONIX) 20 mg, oral, Daily before breakfast    spironolactone (ALDACTONE) 50 mg, oral, Daily        Last Recorded Vitals:  There were no vitals filed for this visit.    Physical Exam    Physical  Patient is alert and oriented x3.  HEENT is unremarkable mucous members are moist  Neck no JVP no bruits upstrokes are full no thyromegaly  Lungs are clear bilaterally.  No wheezing crackles or rales  Heart regular rhythm normal S1-S2 there is no S3 nsoft EKATERINA  Abdomen is soft vessels are positive nontender nondistended no organomegaly no pulsatile masses  Extremities have no edema.  Distal pulses present palpable.  Neuro is grossly nonfocal  Skin has no rashes      Last Labs:  CBC -  Lab Results   Component Value Date    WBC 9.2 08/15/2023    HGB 10.8 (L) 08/15/2023    HCT 35.1 (L) 08/15/2023    MCV 87 08/15/2023     08/15/2023       CMP -  Lab Results   Component Value Date    CALCIUM 11.0 (H) 08/15/2023    PROT 7.3 08/15/2023    ALBUMIN 4.0 08/15/2023    AST 21 08/15/2023    ALT 27 08/15/2023    ALKPHOS 250 (H) 08/15/2023    BILITOT 0.6 08/15/2023       LIPID PANEL -   Lab Results   Component Value Date    CHOL 135 08/05/2023    HDL 43.4 08/05/2023    CHHDL 3.1 08/05/2023    VLDL 23 08/05/2023    TRIG 113 08/05/2023    NHDL 179 04/04/2023       RENAL FUNCTION PANEL -   Lab Results   Component Value Date    K 4.2 08/15/2023       Lab Results   Component Value Date    BNP 44 04/06/2023    HGBA1C 6.0 (A) 04/05/2023     Procedures    Echo 4/25/2024 EF 60%, DDF, moderate LVH, mild AI    Echo 4/5/23 EF 50-55%, Mod LAE, Mild to mod AI    Event 4/2023 Avg HR 88, No afib         Assessment/Plan   1.  Bradycardia.  No recurrence since stopping metoprolol and only taking Bystolic    2.  Left pontine CVA.  Her event recorder did not reveal any episodes of A-fib.  Her echo was unremarkable with exception of aortic insufficiency.  She is on antiplatelet therapy and statins.    3.  Aortic insufficiency.  Repeat echo 4/25/2024 at the AI is mild still.    4.  Hypertension.  Reasonable control    5.  Hyperlipidemia.  This was monitored with Dr. Nash and her neurologist    6.  Hypokalemia. Stable. Followed at [facility] with her chemo blood work    RTC as needed    Alejandro Garcia MD     Instructions and follow up

## 2024-07-21 DIAGNOSIS — I63.9 CEREBRAL INFARCTION, UNSPECIFIED (MULTI): ICD-10-CM

## 2024-07-23 RX ORDER — SPIRONOLACTONE 50 MG/1
50 TABLET, FILM COATED ORAL DAILY
Qty: 90 TABLET | Refills: 3 | Status: SHIPPED | OUTPATIENT
Start: 2024-07-23

## 2025-08-03 DIAGNOSIS — I63.9 CEREBRAL INFARCTION, UNSPECIFIED: ICD-10-CM

## 2025-08-05 RX ORDER — SPIRONOLACTONE 50 MG/1
50 TABLET, FILM COATED ORAL DAILY
COMMUNITY
Start: 2025-08-05